# Patient Record
Sex: MALE | URBAN - METROPOLITAN AREA
[De-identification: names, ages, dates, MRNs, and addresses within clinical notes are randomized per-mention and may not be internally consistent; named-entity substitution may affect disease eponyms.]

---

## 2019-04-01 ENCOUNTER — OFFICE VISIT (OUTPATIENT)
Dept: URBAN - METROPOLITAN AREA CLINIC 81 | Facility: CLINIC | Age: 84
End: 2019-04-01
Payer: MEDICARE

## 2019-04-01 DIAGNOSIS — Z96.1 PRESENCE OF INTRAOCULAR LENS: ICD-10-CM

## 2019-04-01 DIAGNOSIS — H35.363 DRUSEN (DEGENERATIVE) OF MACULA, BILATERAL: ICD-10-CM

## 2019-04-01 DIAGNOSIS — H43.313 VITREOUS MEMBRANES AND STRANDS, BILATERAL: Primary | ICD-10-CM

## 2019-04-01 PROCEDURE — 92004 COMPRE OPH EXAM NEW PT 1/>: CPT | Performed by: OPTOMETRIST

## 2019-04-01 ASSESSMENT — INTRAOCULAR PRESSURE
OD: 18
OS: 22

## 2019-04-01 NOTE — IMPRESSION/PLAN
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 Previous RD repair OS. Plan: No signs of retinal hole, tear or detachment. Reviewed SSRD, discussed potential seriousness of condition, and importance to RTC immediately if detect any SSRD, pt understands.

## 2019-04-01 NOTE — IMPRESSION/PLAN
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 Plan: Will continue to observe/monitor. UV protection. Pt understands.

## 2019-04-01 NOTE — IMPRESSION/PLAN
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  Continue to follow-up with retina specialist.